# Patient Record
Sex: FEMALE | Race: WHITE | NOT HISPANIC OR LATINO | Employment: OTHER | URBAN - METROPOLITAN AREA
[De-identification: names, ages, dates, MRNs, and addresses within clinical notes are randomized per-mention and may not be internally consistent; named-entity substitution may affect disease eponyms.]

---

## 2024-06-24 ENCOUNTER — HOSPITAL ENCOUNTER (EMERGENCY)
Facility: HOSPITAL | Age: 39
Discharge: HOME | End: 2024-06-24
Attending: EMERGENCY MEDICINE

## 2024-06-24 VITALS
HEART RATE: 77 BPM | BODY MASS INDEX: 35.74 KG/M2 | DIASTOLIC BLOOD PRESSURE: 89 MMHG | SYSTOLIC BLOOD PRESSURE: 165 MMHG | OXYGEN SATURATION: 98 % | HEIGHT: 65 IN | RESPIRATION RATE: 18 BRPM | TEMPERATURE: 99 F | WEIGHT: 214.5 LBS
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
## 2024-06-24 DIAGNOSIS — M79.641 HAND PAIN, RIGHT: Primary | ICD-10-CM

## 2024-06-24 PROCEDURE — 29125 APPL SHORT ARM SPLINT STATIC: CPT | Mod: RT

## 2024-06-24 PROCEDURE — 99283 EMERGENCY DEPT VISIT LOW MDM: CPT | Mod: 25

## 2024-06-24 RX ORDER — HYDROCODONE BITARTRATE AND ACETAMINOPHEN 5; 325 MG/1; MG/1
1 TABLET ORAL EVERY 6 HOURS PRN
Qty: 12 TABLET | Refills: 0 | Status: SHIPPED | OUTPATIENT
Start: 2024-06-24

## 2024-06-24 RX ORDER — BUTALBITAL, ACETAMINOPHEN AND CAFFEINE 50; 325; 40 MG/1; MG/1; MG/1
1 TABLET ORAL
Status: DISCONTINUED | OUTPATIENT
Start: 2024-06-24 | End: 2024-06-24

## 2024-06-25 NOTE — ED NOTES
Discharge instructions and prescriptions gone over with patient; reviewed follow up precautions and strict return precautions with patient; verbalized understanding.  Splint applied to right wrist by Porsche UMANZOR.   Patient ambulatory out of ED in stable condition.

## 2024-06-25 NOTE — ED PROVIDER NOTES
Encounter Date: 6/24/2024       History     Chief Complaint   Patient presents with    Hand Pain     Pt to ED with c/o right hand pain x 3 days after trip and fall while holding a box and walking.      Chief complaint: Hand pain   39-year-old female with history of arthritis hypothyroid presents to be evaluated for right hand pain she has had for the last 3 days.  Patient reports that she had a mechanical fall.  Landed on an outstretched hand.  She does report having a wrist replacement done several years ago.  Denies numbness or tingling.      Review of patient's allergies indicates:  No Known Allergies  Past Medical History:   Diagnosis Date    Arthritis     Rheumatoid    Hypothyroid      Past Surgical History:   Procedure Laterality Date    CHOLECYSTECTOMY      NASAL SEPTUM SURGERY       Family History   Problem Relation Name Age of Onset    Hypertension Father      Diabetes Father       Social History     Tobacco Use    Smoking status: Former     Current packs/day: 0.00     Types: Cigarettes     Quit date: 7/31/2013     Years since quitting: 10.9   Substance Use Topics    Alcohol use: No    Drug use: No     Review of Systems   Musculoskeletal:  Positive for arthralgias and myalgias. Negative for gait problem.   Skin:  Negative for color change.   Neurological:  Negative for weakness and numbness.       Physical Exam     Initial Vitals [06/24/24 1851]   BP Pulse Resp Temp SpO2   (!) 165/89 77 18 98.6 °F (37 °C) 98 %      MAP       --         Physical Exam    Nursing note and vitals reviewed.  Constitutional: She appears well-developed and well-nourished.   HENT:   Head: Normocephalic and atraumatic.   Cardiovascular:  Normal rate and regular rhythm.           Pulmonary/Chest: Breath sounds normal. She has no rhonchi. She has no rales. She exhibits no tenderness.   Musculoskeletal:         General: Normal range of motion.     Neurological: She is alert and oriented to person, place, and time. She has normal  strength.   Skin: Skin is warm and dry.         ED Course   Procedures  Labs Reviewed - No data to display       Imaging Results              X-Ray Hand 3 view Right (In process)  Result time 06/24/24 19:50:31                     X-Ray Wrist Complete Right (Final result)  Result time 06/24/24 19:49:35      Final result by Manuela Gong MD (06/24/24 19:49:35)                   Impression:      No acute fracture.      Electronically signed by: Manuela Gong  Date:    06/24/2024  Time:    19:49               Narrative:    EXAMINATION:  XR WRIST COMPLETE 3 VIEWS RIGHT    CLINICAL HISTORY:  Unspecified fall, initial encounter    TECHNIQUE:  PA, lateral, and oblique views of the right wrist were performed.    COMPARISON:  None    FINDINGS:  Postsurgical changes of radiocarpal arthroplasty.  Hardware is intact.  Moderate distal radioulnar joint osteoarthritis.  No acute fracture.                                       Medications - No data to display  Medical Decision Making  [age] year female presents to be evaluated for right wrist pain after she tripped and fell on an outstretched hand.    Differential diagnosis includes sprain, strain, fracture, ligamentous injury    Amount and/or Complexity of Data Reviewed  Radiology: ordered.    Risk  Prescription drug management.  Risk Details: Patient with some tenderness to the lateral aspect of the right hand  Patient with pain with flexion extension   Negative imaging in ED today   Placed in boxer's splint for possible sprain   Given medication for pain  Instructed to follow-up with orthopedics patient states she will return home to [place] and follow up over there                                      Clinical Impression:  Final diagnoses:  [W19.XXXA] Fall  [M79.641] Hand pain, right (Primary)          ED Disposition Condition    Discharge Stable          ED Prescriptions       Medication Sig Dispense Start Date End Date Auth. Provider     HYDROcodone-acetaminophen (NORCO) 5-325 mg per tablet Take 1 tablet by mouth every 6 (six) hours as needed for Pain. 12 tablet 6/24/2024 -- Porsche Williamson NP          Follow-up Information    None          Porsche Williamson NP  06/24/24 2057